# Patient Record
Sex: FEMALE | Race: WHITE | NOT HISPANIC OR LATINO | URBAN - METROPOLITAN AREA
[De-identification: names, ages, dates, MRNs, and addresses within clinical notes are randomized per-mention and may not be internally consistent; named-entity substitution may affect disease eponyms.]

---

## 2017-12-11 ENCOUNTER — ON CAMPUS - OUTPATIENT (AMBULATORY)
Dept: URBAN - METROPOLITAN AREA HOSPITAL 2 | Facility: HOSPITAL | Age: 62
End: 2017-12-11

## 2017-12-11 VITALS
HEART RATE: 83 BPM | OXYGEN SATURATION: 99 % | OXYGEN SATURATION: 100 % | HEIGHT: 67 IN | HEART RATE: 84 BPM | RESPIRATION RATE: 16 BRPM | DIASTOLIC BLOOD PRESSURE: 81 MMHG | HEART RATE: 72 BPM | HEART RATE: 86 BPM | WEIGHT: 150 LBS | RESPIRATION RATE: 20 BRPM | HEART RATE: 90 BPM | DIASTOLIC BLOOD PRESSURE: 89 MMHG | HEART RATE: 77 BPM | RESPIRATION RATE: 12 BRPM | SYSTOLIC BLOOD PRESSURE: 146 MMHG | RESPIRATION RATE: 18 BRPM | SYSTOLIC BLOOD PRESSURE: 151 MMHG | HEART RATE: 68 BPM | DIASTOLIC BLOOD PRESSURE: 78 MMHG | TEMPERATURE: 97.9 F | DIASTOLIC BLOOD PRESSURE: 74 MMHG | DIASTOLIC BLOOD PRESSURE: 82 MMHG | SYSTOLIC BLOOD PRESSURE: 166 MMHG | SYSTOLIC BLOOD PRESSURE: 147 MMHG | DIASTOLIC BLOOD PRESSURE: 77 MMHG | HEART RATE: 73 BPM | SYSTOLIC BLOOD PRESSURE: 129 MMHG | SYSTOLIC BLOOD PRESSURE: 159 MMHG

## 2017-12-11 DIAGNOSIS — K64.1 SECOND DEGREE HEMORRHOIDS: ICD-10-CM

## 2017-12-11 DIAGNOSIS — Z12.11 ENCOUNTER FOR SCREENING FOR MALIGNANT NEOPLASM OF COLON: ICD-10-CM

## 2017-12-11 DIAGNOSIS — K57.30 DIVERTICULOSIS OF LARGE INTESTINE WITHOUT PERFORATION OR ABS: ICD-10-CM

## 2017-12-11 PROCEDURE — 45378 DIAGNOSTIC COLONOSCOPY: CPT | Mod: 33 | Performed by: INTERNAL MEDICINE

## 2017-12-11 RX ADMIN — PROPOFOL: 10 INJECTION, EMULSION INTRAVENOUS at 11:47

## 2018-01-25 ENCOUNTER — HOSPITAL ENCOUNTER (OUTPATIENT)
Dept: CARDIOLOGY | Facility: HOSPITAL | Age: 63
Discharge: HOME | End: 2018-01-25
Attending: FAMILY MEDICINE | Admitting: FAMILY MEDICINE

## 2022-05-10 ENCOUNTER — OFFICE (AMBULATORY)
Dept: URBAN - METROPOLITAN AREA CLINIC 64 | Facility: CLINIC | Age: 67
End: 2022-05-10

## 2022-05-10 VITALS
HEART RATE: 76 BPM | HEIGHT: 67 IN | SYSTOLIC BLOOD PRESSURE: 132 MMHG | WEIGHT: 154 LBS | DIASTOLIC BLOOD PRESSURE: 72 MMHG

## 2022-05-10 DIAGNOSIS — K59.00 CONSTIPATION, UNSPECIFIED: ICD-10-CM

## 2022-05-10 PROCEDURE — 99203 OFFICE O/P NEW LOW 30 MIN: CPT | Performed by: INTERNAL MEDICINE

## 2022-09-27 ENCOUNTER — OFFICE (AMBULATORY)
Dept: URBAN - METROPOLITAN AREA CLINIC 64 | Facility: CLINIC | Age: 67
End: 2022-09-27

## 2022-09-27 VITALS
HEART RATE: 77 BPM | WEIGHT: 146 LBS | HEIGHT: 67 IN | DIASTOLIC BLOOD PRESSURE: 82 MMHG | SYSTOLIC BLOOD PRESSURE: 137 MMHG

## 2022-09-27 DIAGNOSIS — K59.00 CONSTIPATION, UNSPECIFIED: ICD-10-CM

## 2022-09-27 PROCEDURE — 99213 OFFICE O/P EST LOW 20 MIN: CPT | Performed by: INTERNAL MEDICINE

## 2023-06-15 ENCOUNTER — OFFICE (AMBULATORY)
Dept: URBAN - METROPOLITAN AREA CLINIC 64 | Facility: CLINIC | Age: 68
End: 2023-06-15

## 2023-06-15 VITALS — HEIGHT: 67 IN | WEIGHT: 137 LBS

## 2023-06-15 DIAGNOSIS — K59.00 CONSTIPATION, UNSPECIFIED: ICD-10-CM

## 2023-06-15 DIAGNOSIS — R11.2 NAUSEA WITH VOMITING, UNSPECIFIED: ICD-10-CM

## 2023-06-15 PROCEDURE — 99214 OFFICE O/P EST MOD 30 MIN: CPT | Performed by: INTERNAL MEDICINE

## 2023-06-15 RX ORDER — PANTOPRAZOLE SODIUM 40 MG/1
40 TABLET, DELAYED RELEASE ORAL
Qty: 30 | Refills: 11 | Status: ACTIVE
Start: 2023-06-15

## 2023-09-28 ENCOUNTER — OFFICE (AMBULATORY)
Dept: URBAN - METROPOLITAN AREA CLINIC 64 | Facility: CLINIC | Age: 68
End: 2023-09-28

## 2023-09-28 VITALS
WEIGHT: 136 LBS | HEIGHT: 67 IN | DIASTOLIC BLOOD PRESSURE: 76 MMHG | SYSTOLIC BLOOD PRESSURE: 119 MMHG | HEART RATE: 64 BPM

## 2023-09-28 DIAGNOSIS — R11.2 NAUSEA WITH VOMITING, UNSPECIFIED: ICD-10-CM

## 2023-09-28 DIAGNOSIS — K59.00 CONSTIPATION, UNSPECIFIED: ICD-10-CM

## 2023-09-28 PROCEDURE — 99213 OFFICE O/P EST LOW 20 MIN: CPT | Performed by: INTERNAL MEDICINE

## 2024-06-08 ENCOUNTER — APPOINTMENT (OUTPATIENT)
Dept: CT IMAGING | Facility: HOSPITAL | Age: 69
End: 2024-06-08
Payer: MEDICARE

## 2024-06-08 ENCOUNTER — HOSPITAL ENCOUNTER (EMERGENCY)
Facility: HOSPITAL | Age: 69
Discharge: HOME | End: 2024-06-08
Attending: EMERGENCY MEDICINE | Admitting: EMERGENCY MEDICINE
Payer: MEDICARE

## 2024-06-08 VITALS
BODY MASS INDEX: 23.32 KG/M2 | RESPIRATION RATE: 16 BRPM | SYSTOLIC BLOOD PRESSURE: 141 MMHG | TEMPERATURE: 97.8 F | HEIGHT: 65 IN | WEIGHT: 140 LBS | HEART RATE: 98 BPM | OXYGEN SATURATION: 97 % | DIASTOLIC BLOOD PRESSURE: 85 MMHG

## 2024-06-08 DIAGNOSIS — S00.03XA CONTUSION OF SCALP, INITIAL ENCOUNTER: Primary | ICD-10-CM

## 2024-06-08 PROCEDURE — 70450 CT HEAD/BRAIN W/O DYE: CPT

## 2024-06-08 PROCEDURE — 72125 CT NECK SPINE W/O DYE: CPT

## 2024-06-08 PROCEDURE — 99284 EMERGENCY DEPT VISIT MOD MDM: CPT

## 2024-06-08 NOTE — ED PROVIDER NOTES
"Subjective   History of Present Illness  68-year-old female presents from the extended care [facility] dementia unit with family ember at bedside stating that she has had a couple falls today.  He states that she is very prone to falling over recent months and that she is currently at her baseline mental status by his assessment which is essentially nonverbal.  He states she reportedly hit the back of her head this afternoon when she fell.  There is no reported loss of consciousness.  Patient unable to give any further history or review of systems.  He believes she is not on anticoagulants  Review of Systems    No past medical history on file.  Advanced dementia  No Known Allergies    No past surgical history on file.    No family history on file.    Social History     Socioeconomic History    Marital status:      Prior to Admission medications    Not on File     /77   Pulse 96   Temp 97.8 °F (36.6 °C) (Oral)   Resp 16   Ht 165.1 cm (65\")   Wt 63.5 kg (140 lb)   SpO2 97%   BMI 23.30 kg/m²         Objective   Physical Exam  General: Thin elderly appearing female awake, no acute distress  Eyes: Pupils midsized round and equal, sclera nonicteric, eyes track me across the room  HEENT: Mucous membranes moist, no mucosal swelling, small hematoma on the occiput, no open wounds, no skull depression, no raccoon eyes or Tesfaye sign  Neck: C-spine no apparent tenderness, no step-off or crepitus  Thoracic and lumbar spine nontender  Respirations: Respirations nonlabored, equal breath sounds bilaterally, clear lungs  Heart regular rate and rhythm,    Abdomen soft nontender nondistended, pelvis stable nontender  Extremities no apparent bony tenderness of the extremities, not following commands, range of motion of the extremities essentially intact she does seem to have some joint stiffness but no apparent pain with manipulation of the limbs, limbs appear well-perfused  Neuro no apparent lateralizing deficits, " advanced dementia, not following commands, reportedly at baseline  Skin no rash, brisk cap refill  Procedures           ED Course      CT Head Without Contrast    Result Date: 6/8/2024  Impression: Head: 1. No intracranial hemorrhage. 2. Left posterior occipital scalp contusion. No underlying fracture. 3. 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 4. Remote left temporal infarct with encephalomalacia. Cervical spine: 1. Negative for cervical spine fracture. 2. Cervical degenerative findings above. 3. Additional incidental findings above. Electronically Signed: Ze Tarango MD  6/8/2024 5:04 PM EDT  Workstation ID: [id number]    CT Cervical Spine Without Contrast    Result Date: 6/8/2024  Impression: Head: 1. No intracranial hemorrhage. 2. Left posterior occipital scalp contusion. No underlying fracture. 3. 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 4. Remote left temporal infarct with encephalomalacia. Cervical spine: 1. Negative for cervical spine fracture. 2. Cervical degenerative findings above. 3. Additional incidental findings above. Electronically Signed: Ze Tarango MD  6/8/2024 5:04 PM EDT  Workstation ID: [id number]                                          Medical Decision Making  Per EMS report CT head ventriculomegaly unchanged.  I did asked the  if this was addressed at the outlying facilities and he states that he does recall some discussion about this but was not recalling any suggestion of her being a surgical candidate or any intervention that was planned on being done.  He reports she is currently at her baseline mental status.  No acute intracranial trauma or C-spine injury noted on today's workup.  He is agreeable plan of patient discharged back to [place] for her  ongoing dementia care.  They are given warning signs for return    Problems Addressed:  Contusion of scalp, initial encounter: complicated acute illness or injury    Amount and/or Complexity of Data Reviewed  Radiology: ordered and independent interpretation performed.     Details: My independent interpretation of CT head image ventricular dilation, no acute hemorrhage        Final diagnoses:   Contusion of scalp, initial encounter       ED Disposition  ED Disposition       ED Disposition   Discharge    Condition   Stable    Comment   --               Tiny Cochran MD  [address] [facility] [place] [address] [phone]      As needed         Medication List      No changes were made to your prescriptions during this visit.            Arturo Grimes MD  06/08/24 [id number]

## 2024-06-08 NOTE — DISCHARGE INSTRUCTIONS
Maintain head injury precautions.  Maintain fall precautions.  Return for altered mental status, persistent vomiting, severe pain or any other concerns